# Patient Record
Sex: FEMALE | Race: WHITE | NOT HISPANIC OR LATINO | Employment: OTHER | URBAN - METROPOLITAN AREA
[De-identification: names, ages, dates, MRNs, and addresses within clinical notes are randomized per-mention and may not be internally consistent; named-entity substitution may affect disease eponyms.]

---

## 2020-12-21 ENCOUNTER — HOSPITAL ENCOUNTER (OUTPATIENT)
Dept: RADIOLOGY | Facility: HOSPITAL | Age: 65
Discharge: HOME | End: 2020-12-21
Attending: OTOLARYNGOLOGY
Payer: MEDICARE

## 2020-12-21 ENCOUNTER — OFFICE VISIT (OUTPATIENT)
Dept: OTOLARYNGOLOGY | Facility: CLINIC | Age: 65
End: 2020-12-21
Payer: MEDICARE

## 2020-12-21 VITALS — SYSTOLIC BLOOD PRESSURE: 104 MMHG | DIASTOLIC BLOOD PRESSURE: 50 MMHG | HEART RATE: 103 BPM | WEIGHT: 130.75 LBS

## 2020-12-21 DIAGNOSIS — C77.0 SECONDARY MALIGNANT NEOPLASM OF CERVICAL LYMPH NODE: ICD-10-CM

## 2020-12-21 DIAGNOSIS — C77.0 SECONDARY MALIGNANT NEOPLASM OF CERVICAL LYMPH NODE: Primary | ICD-10-CM

## 2020-12-21 PROBLEM — E03.9 ACQUIRED HYPOTHYROIDISM: Status: ACTIVE | Noted: 2020-12-21

## 2020-12-21 PROBLEM — I10 ESSENTIAL HYPERTENSION: Status: ACTIVE | Noted: 2020-12-21

## 2020-12-21 PROBLEM — C32.1 MALIGNANT NEOPLASM OF SUPRAGLOTTIS: Status: ACTIVE | Noted: 2018-01-17

## 2020-12-21 PROCEDURE — 99999 PR PBB SHADOW E&M-EST. PATIENT-LVL III: ICD-10-PCS | Mod: PBBFAC,,, | Performed by: OTOLARYNGOLOGY

## 2020-12-21 PROCEDURE — 70491 CT SOFT TISSUE NECK W/DYE: CPT | Mod: TC

## 2020-12-21 PROCEDURE — 70491 CT SOFT TISSUE NECK WITH CONTRAST: ICD-10-PCS | Mod: 26,,, | Performed by: RADIOLOGY

## 2020-12-21 PROCEDURE — 99999 PR PBB SHADOW E&M-EST. PATIENT-LVL III: CPT | Mod: PBBFAC,,, | Performed by: OTOLARYNGOLOGY

## 2020-12-21 PROCEDURE — 70491 CT SOFT TISSUE NECK W/DYE: CPT | Mod: 26,,, | Performed by: RADIOLOGY

## 2020-12-21 PROCEDURE — 99213 OFFICE O/P EST LOW 20 MIN: CPT | Mod: PBBFAC,25 | Performed by: OTOLARYNGOLOGY

## 2020-12-21 PROCEDURE — 25500020 PHARM REV CODE 255: Performed by: OTOLARYNGOLOGY

## 2020-12-21 PROCEDURE — 99203 PR OFFICE/OUTPT VISIT, NEW, LEVL III, 30-44 MIN: ICD-10-PCS | Mod: S$PBB,,, | Performed by: OTOLARYNGOLOGY

## 2020-12-21 PROCEDURE — 99203 OFFICE O/P NEW LOW 30 MIN: CPT | Mod: S$PBB,,, | Performed by: OTOLARYNGOLOGY

## 2020-12-21 RX ORDER — LEVOTHYROXINE SODIUM 50 UG/1
TABLET ORAL
COMMUNITY
Start: 2020-12-15

## 2020-12-21 RX ORDER — ATORVASTATIN CALCIUM 40 MG/1
40 TABLET, FILM COATED ORAL
COMMUNITY

## 2020-12-21 RX ORDER — CALCIUM CARBONATE 500(1250)
1 TABLET ORAL DAILY
COMMUNITY

## 2020-12-21 RX ORDER — TEMAZEPAM 15 MG/1
15 CAPSULE ORAL
COMMUNITY
Start: 2020-08-13

## 2020-12-21 RX ORDER — GABAPENTIN 300 MG/1
CAPSULE ORAL
COMMUNITY
Start: 2020-10-29

## 2020-12-21 RX ORDER — LISINOPRIL 10 MG/1
TABLET ORAL
COMMUNITY
Start: 2020-12-15

## 2020-12-21 RX ORDER — ALENDRONATE SODIUM 70 MG/1
TABLET ORAL
COMMUNITY
Start: 2020-12-04

## 2020-12-21 RX ORDER — CYCLOBENZAPRINE HCL 10 MG
TABLET ORAL
COMMUNITY
Start: 2020-10-08

## 2020-12-21 RX ADMIN — IOHEXOL 75 ML: 350 INJECTION, SOLUTION INTRAVENOUS at 05:12

## 2020-12-21 NOTE — LETTER
December 21, 2020      Sunny Jones MD  [address] [place] [address]           [id number]  [address]  [facility] [address]  Phone: [phone]  Fax: [phone]          Patient: Martha Engelmann   MR Number: [id number]   YOB: 1955   Date of Visit: 12/21/2020       Dear Dr. Sunny Jones:    Thank you for referring Martha Engelmann to me for evaluation. Attached you will find relevant portions of my assessment and plan of care.    If you have questions, please do not hesitate to call me. I look forward to following Martha Engelmann along with you.    Sincerely,    David Chambers MD    Enclosure  CC:  No Recipients    If you would like to receive this communication electronically, please contact [email or web address] or [phone] to request more information on [facility] access.    For providers and/or their staff who would like to refer a patient to [facility], please contact us through our one-stop-shop provider referral line, [facility], at [phone].    If you feel you have received this communication in error or would no longer like to receive these types of communications, please e-mail [email or web address]

## 2020-12-21 NOTE — ASSESSMENT & PLAN NOTE
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  She has been receiving essentially palliative treatment.  I ordered a CT scan of the neck to assess her candidacy for surgery and will contact her once I have reviewed her scan.

## 2020-12-21 NOTE — PROGRESS NOTES
Chief Complaint   Patient presents with    Other     malignant neoplasm of lymph nodes       HPI   65 y.o. female presents with a history of squamous cell carcinoma of the supraglottis status post chemoradiation therapy in 2017.  In 2019, she was found to have persistent disease in the left neck.  Surgery was deemed to be too high risk at that time, she was started on palliative chemotherapy.  Since then, she has been on multiple chemotherapy regimens.  Most recently, she has been on single agent Taxol.  Recent PET-CT scan revealed apparent progression of disease.  There was no convincing evidence of distant metastasis.  She presents today for surgical evaluation.    Review of Systems   Constitutional: Negative for fatigue and unexpected weight change.   HENT: Per HPI.  Eyes: Negative for visual disturbance.   Respiratory: Negative for shortness of breath, hemoptysis   Cardiovascular: Negative for chest pain and palpitations.   Musculoskeletal: Negative for decreased ROM, back pain.   Skin: Negative for rash, sunburn, itching.   Neurological: Negative for dizziness and seizures.   Hematological: Negative for adenopathy. Does not bruise/bleed easily.   Endocrine: Negative for rapid weight loss/weight gain, heat/cold intolerance.     Past Medical History   Patient Active Problem List   Diagnosis    Malignant neoplasm of supraglottis    Secondary malignant neoplasm of cervical lymph node    Essential hypertension    Acquired hypothyroidism           Past Surgical History   No past surgical history on file.      Family History   No family history on file.        Social History   .  Social History     Socioeconomic History    Marital status: Single     Spouse name: Not on file    Number of children: Not on file    Years of education: Not on file    Highest education level: Not on file   Occupational History    Not on file   Social Needs    Financial resource strain: Not on file    Food insecurity     Worry:  Not on file     Inability: Not on file    Transportation needs     Medical: Not on file     Non-medical: Not on file   Tobacco Use    Smoking status: Not on file   Substance and Sexual Activity    Alcohol use: Not on file    Drug use: Not on file    Sexual activity: Not on file   Lifestyle    Physical activity     Days per week: Not on file     Minutes per session: Not on file    Stress: Not on file   Relationships    Social connections     Talks on phone: Not on file     Gets together: Not on file     Attends [employer] service: Not on file     Active member of club or organization: Not on file     Attends meetings of clubs or organizations: Not on file     Relationship status: Not on file   Other Topics Concern    Not on file   Social History Narrative    Not on file         Allergies   Review of patient's allergies indicates:   Allergen Reactions    Cetuximab Anaphylaxis           Physical Exam     Vitals:    12/21/20 1356   BP: (!) 104/50   Pulse: 103         There is no height or weight on file to calculate BMI.      General: AOx3, NAD   Respiratory:  Symmetric chest rise, normal effort  Nose: No gross nasal septal deviation. Inferior Turbinates WNL bilaterally. No septal perforation. No masses/lesions.   Oral Cavity:  Oral Tongue mobile, no lesions noted. Hard Palate WNL. No buccal or FOM lesions.  Oropharynx:  No masses/lesions of the posterior pharyngeal wall. Tonsillar fossa without lesions. Soft palate without masses. Midline uvula.   Neck: No scars.  Marked fibrosis of the left neck.  Difficult to palpate any obvious adenopathy.  No cervical lymphadenopathy, thyromegaly or thyroid nodules.  Normal range of motion.    Face: House Brackmann I bilaterally.     Outside PET-CT report reviewed.    Assessment/Plan  Problem List Items Addressed This Visit        Oncology    Secondary malignant neoplasm of cervical lymph node - Primary     Persistent disease in the left neck status post chemoradiation  therapy for squamous cell carcinoma of the supraglottis.  She has been receiving essentially palliative treatment.  I ordered a CT scan of the neck to assess her candidacy for surgery and will contact her once I have reviewed her scan.         Relevant Orders    CT Soft Tissue Neck With Contrast    Creatinine, serum

## 2020-12-22 ENCOUNTER — TELEPHONE (OUTPATIENT)
Dept: OTOLARYNGOLOGY | Facility: CLINIC | Age: 65
End: 2020-12-22

## 2020-12-22 NOTE — TELEPHONE ENCOUNTER
I reviewed Ms. Engelmann's scan and spoke to her regarding the findings.  Her neck disease is unresectable.  Return to med onc for consideration of additional chemo.